# Patient Record
Sex: FEMALE | Race: WHITE | NOT HISPANIC OR LATINO
[De-identification: names, ages, dates, MRNs, and addresses within clinical notes are randomized per-mention and may not be internally consistent; named-entity substitution may affect disease eponyms.]

---
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
## 2022-04-01 ENCOUNTER — INPATIENT (INPATIENT)
Facility: HOSPITAL | Age: 22
LOS: 3 days | Discharge: HOME | End: 2022-04-05
Attending: PSYCHIATRY & NEUROLOGY | Admitting: PSYCHIATRY & NEUROLOGY

## 2022-04-01 ENCOUNTER — OUTPATIENT (OUTPATIENT)
Dept: OUTPATIENT SERVICES | Facility: HOSPITAL | Age: 22
LOS: 1 days | Discharge: STILL ADMITTED OR ADMINISTRATIVE | End: 2022-04-01

## 2022-04-01 VITALS — TEMPERATURE: 98 F

## 2022-04-01 DIAGNOSIS — F33.2 MAJOR DEPRESSIVE DISORDER, RECURRENT SEVERE WITHOUT PSYCHOTIC FEATURES: ICD-10-CM

## 2022-04-01 LAB
FLUAV AG NPH QL: SIGNIFICANT CHANGE UP
FLUBV AG NPH QL: SIGNIFICANT CHANGE UP
RSV RNA NPH QL NAA+NON-PROBE: SIGNIFICANT CHANGE UP
SARS-COV-2 RNA SPEC QL NAA+PROBE: SIGNIFICANT CHANGE UP

## 2022-04-01 PROCEDURE — 99215 OFFICE O/P EST HI 40 MIN: CPT | Mod: 95

## 2022-04-01 RX ORDER — MULTIVIT-MIN/FERROUS GLUCONATE 9 MG/15 ML
1 LIQUID (ML) ORAL DAILY
Refills: 0 | Status: DISCONTINUED | OUTPATIENT
Start: 2022-04-01 | End: 2022-04-05

## 2022-04-01 RX ORDER — HYDROXYZINE HCL 10 MG
25 TABLET ORAL EVERY 4 HOURS
Refills: 0 | Status: DISCONTINUED | OUTPATIENT
Start: 2022-04-01 | End: 2022-04-05

## 2022-04-01 RX ORDER — ARIPIPRAZOLE 15 MG/1
10 TABLET ORAL DAILY
Refills: 0 | Status: DISCONTINUED | OUTPATIENT
Start: 2022-04-01 | End: 2022-04-02

## 2022-04-01 RX ORDER — NICOTINE POLACRILEX 2 MG
1 GUM BUCCAL DAILY
Refills: 0 | Status: DISCONTINUED | OUTPATIENT
Start: 2022-04-01 | End: 2022-04-05

## 2022-04-01 RX ORDER — DIPHENHYDRAMINE HCL 50 MG
50 CAPSULE ORAL ONCE
Refills: 0 | Status: DISCONTINUED | OUTPATIENT
Start: 2022-04-01 | End: 2022-04-05

## 2022-04-01 RX ORDER — HYDROXYZINE HCL 10 MG
25 TABLET ORAL EVERY 4 HOURS
Refills: 0 | Status: DISCONTINUED | OUTPATIENT
Start: 2022-04-01 | End: 2022-04-01

## 2022-04-01 RX ORDER — LANOLIN ALCOHOL/MO/W.PET/CERES
3 CREAM (GRAM) TOPICAL AT BEDTIME
Refills: 0 | Status: DISCONTINUED | OUTPATIENT
Start: 2022-04-01 | End: 2022-04-05

## 2022-04-01 RX ORDER — CLONAZEPAM 1 MG
0.5 TABLET ORAL
Refills: 0 | Status: DISCONTINUED | OUTPATIENT
Start: 2022-04-01 | End: 2022-04-02

## 2022-04-01 RX ORDER — NICOTINE POLACRILEX 2 MG
2 GUM BUCCAL
Refills: 0 | Status: DISCONTINUED | OUTPATIENT
Start: 2022-04-01 | End: 2022-04-01

## 2022-04-01 RX ORDER — PREGABALIN 225 MG/1
1000 CAPSULE ORAL DAILY
Refills: 0 | Status: DISCONTINUED | OUTPATIENT
Start: 2022-04-01 | End: 2022-04-05

## 2022-04-01 RX ORDER — DIPHENHYDRAMINE HCL 50 MG
50 CAPSULE ORAL EVERY 6 HOURS
Refills: 0 | Status: DISCONTINUED | OUTPATIENT
Start: 2022-04-01 | End: 2022-04-05

## 2022-04-01 RX ORDER — NICOTINE POLACRILEX 2 MG
4 GUM BUCCAL
Refills: 0 | Status: DISCONTINUED | OUTPATIENT
Start: 2022-04-01 | End: 2022-04-05

## 2022-04-01 RX ORDER — HALOPERIDOL DECANOATE 100 MG/ML
5 INJECTION INTRAMUSCULAR ONCE
Refills: 0 | Status: DISCONTINUED | OUTPATIENT
Start: 2022-04-01 | End: 2022-04-05

## 2022-04-01 RX ORDER — HALOPERIDOL DECANOATE 100 MG/ML
5 INJECTION INTRAMUSCULAR EVERY 6 HOURS
Refills: 0 | Status: DISCONTINUED | OUTPATIENT
Start: 2022-04-01 | End: 2022-04-05

## 2022-04-01 RX ADMIN — Medication 3 MILLIGRAM(S): at 20:24

## 2022-04-01 RX ADMIN — Medication 4 MILLIGRAM(S): at 20:24

## 2022-04-01 RX ADMIN — Medication 0.5 MILLIGRAM(S): at 20:24

## 2022-04-01 NOTE — BH PATIENT PROFILE - FALL HARM RISK - UNIVERSAL INTERVENTIONS
Bed in lowest position, wheels locked, appropriate side rails in place/Call bell, personal items and telephone in reach/Instruct patient to call for assistance before getting out of bed or chair/Non-slip footwear when patient is out of bed/[place] to call system/Physically safe environment - no spills, clutter or unnecessary equipment/Purposeful Proactive Rounding/Room/bathroom lighting operational, light cord in reach

## 2022-04-01 NOTE — BH PATIENT PROFILE - NSCMSPTSTRENGTHS_PSY_ALL_CORE
Expressive of emotions/Future/goal oriented/Intact employment/Intelligence/Motivated/Strong support system

## 2022-04-01 NOTE — CHART NOTE - NSCHARTNOTEFT_GEN_A_CORE
Screening Medical Evaluation    Patient Admitted from: [facility] admitting diagnosis: Severe episode of recurrent major depressive disorder, without psychotic features      PAST MEDICAL & SURGICAL HISTORY:  Anxiety  No significant past surgical history    ALLERGIES:  No known allergies    SOCIAL HISTORY:  Patient states she vapes nicotine and THC but does not know the amount. Patient also endorses alcohol use socially 1-2x/week    FAMILY HISTORY:  No known pertinent family history in 1st degree relatives      MEDICATIONS  (STANDING):  ARIPiprazole 10 milliGRAM(s) Oral daily  clonazePAM  Tablet 0.5 milliGRAM(s) Oral two times a day  cyanocobalamin 1000 MICROGram(s) Oral daily  multivitamin/minerals 1 Tablet(s) Oral daily  nicotine - 21 mG/24Hr(s) Patch 1 patch Transdermal daily    MEDICATIONS  (PRN):  diphenhydrAMINE 50 milliGRAM(s) Oral every 6 hours PRN agitation  diphenhydrAMINE Injectable 50 milliGRAM(s) IntraMuscular once PRN agitation  haloperidol     Tablet 5 milliGRAM(s) Oral every 6 hours PRN agiation  haloperidol    Injectable 5 milliGRAM(s) IntraMuscular once PRN agitation  hydrOXYzine hydrochloride 25 milliGRAM(s) Oral every 4 hours PRN anxiety  LORazepam     Tablet 2 milliGRAM(s) Oral every 6 hours PRN agitation  LORazepam   Injectable 2 milliGRAM(s) IntraMuscular once PRN agitation  melatonin. 3 milliGRAM(s) Oral at bedtime PRN Insomnia  nicotine  Polacrilex Gum 4 milliGRAM(s) Oral every 2 hours PRN nicotine dependence      Vital Signs Last 24 Hrs  T(C): 36.8 (01 Apr 2022 18:36), Max: 36.8 (01 Apr 2022 18:36)  T(F): 98.3 (01 Apr 2022 18:36), Max: 98.3 (01 Apr 2022 18:36)  HR: 82 (01 Apr 2022 18:36)  BP: 107/71 (01 Apr 2022 18:36)  BP(mean): --  RR: --  SpO2: --      PHYSICAL EXAM:  GENERAL: NAD, well-developed  HEAD:  Atraumatic, Normocephalic  EYES: EOMI, PERRLA, conjunctiva and sclera clear  NECK: Supple, No JVD  CHEST/LUNG: Clear to auscultation bilaterally; No wheeze  HEART: Regular rate and rhythm; No murmurs, rubs, or gallops  ABDOMEN: Soft, Nontender, Nondistended; Bowel sounds present  EXTREMITIES:  2+ Peripheral Pulses, No clubbing, cyanosis, or edema  PSYCH: AAOx3  NEUROLOGY: non-focal  SKIN: No rashes or lesions    Assessment and Plan:  21F admitted to [facility] for severe episode of recurrent major depressive disorder, without psychotic features w/ no significant PMHx for medical screening. Patient c/o of anxiety and poor sleep for the past several years. Patient has no other acute complaints at this time. Patient denies fever, chills, headache, dizziness, lightheadedness, N/V, SOB, cough, congestion, chest pain, abdominal pain, dysuria, hematuria, diarrhea, constipation. Physical exam unremarkable, VSS. Admission labs pending.     1.) Severe episode of recurrent major depressive disorder, without psychotic features: Refer to primary team documentation for recs   2.) Nicotine dependence: Continue nicotine gum 4mg Q2h PRN, nicotine patch daily
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
## 2022-04-01 NOTE — BH INPATIENT PSYCHIATRY ASSESSMENT NOTE - NSBHCHARTREVIEWVS_PSY_A_CORE FT
Vital Signs Last 24 Hrs  T(C): 36.8 (04-01-22 @ 18:36), Max: 36.8 (04-01-22 @ 18:36)  T(F): 98.3 (04-01-22 @ 18:36), Max: 98.3 (04-01-22 @ 18:36)  HR: --  BP: --  BP(mean): --  RR: --  SpO2: --    Orthostatic VS  04-01-22 @ 18:36  Lying BP: --/-- HR: --  Sitting BP: 107/71 HR: 82  Standing BP: 122/97 HR: 84  Site: --  Mode: --

## 2022-04-01 NOTE — BH PATIENT PROFILE - NSSBIRTDRGBRIEFINTDET_GEN_A_CORE
reviewed: Chronic, regular cannabis use during adolescence has shown to negatively affect memory, attention, and psychomotor skills, potentially causing irreversible cognitive impairment.

## 2022-04-02 DIAGNOSIS — F43.10 POST-TRAUMATIC STRESS DISORDER, UNSPECIFIED: ICD-10-CM

## 2022-04-02 DIAGNOSIS — F41.1 GENERALIZED ANXIETY DISORDER: ICD-10-CM

## 2022-04-02 DIAGNOSIS — F90.9 ATTENTION-DEFICIT HYPERACTIVITY DISORDER, UNSPECIFIED TYPE: ICD-10-CM

## 2022-04-02 LAB
A1C WITH ESTIMATED AVERAGE GLUCOSE RESULT: 4.4 % — SIGNIFICANT CHANGE UP (ref 4–5.6)
ALBUMIN SERPL ELPH-MCNC: 4.9 G/DL — SIGNIFICANT CHANGE UP (ref 3.3–5)
ALP SERPL-CCNC: 63 U/L — SIGNIFICANT CHANGE UP (ref 40–120)
ALT FLD-CCNC: 7 U/L — SIGNIFICANT CHANGE UP (ref 4–33)
ANION GAP SERPL CALC-SCNC: 13 MMOL/L — SIGNIFICANT CHANGE UP (ref 7–14)
AST SERPL-CCNC: 16 U/L — SIGNIFICANT CHANGE UP (ref 4–32)
BILIRUB SERPL-MCNC: 0.7 MG/DL — SIGNIFICANT CHANGE UP (ref 0.2–1.2)
BUN SERPL-MCNC: 11 MG/DL — SIGNIFICANT CHANGE UP (ref 7–23)
CALCIUM SERPL-MCNC: 9.8 MG/DL — SIGNIFICANT CHANGE UP (ref 8.4–10.5)
CHLORIDE SERPL-SCNC: 108 MMOL/L — HIGH (ref 98–107)
CHOLEST SERPL-MCNC: 143 MG/DL — SIGNIFICANT CHANGE UP
CO2 SERPL-SCNC: 22 MMOL/L — SIGNIFICANT CHANGE UP (ref 22–31)
CREAT SERPL-MCNC: 0.72 MG/DL — SIGNIFICANT CHANGE UP (ref 0.5–1.3)
EGFR: 121 ML/MIN/1.73M2 — SIGNIFICANT CHANGE UP
ESTIMATED AVERAGE GLUCOSE: 80 — SIGNIFICANT CHANGE UP
GLUCOSE SERPL-MCNC: 71 MG/DL — SIGNIFICANT CHANGE UP (ref 70–99)
HCT VFR BLD CALC: 40.1 % — SIGNIFICANT CHANGE UP (ref 34.5–45)
HDLC SERPL-MCNC: 53 MG/DL — SIGNIFICANT CHANGE UP
HGB BLD-MCNC: 12.7 G/DL — SIGNIFICANT CHANGE UP (ref 11.5–15.5)
LIPID PNL WITH DIRECT LDL SERPL: 81 MG/DL — SIGNIFICANT CHANGE UP
MCHC RBC-ENTMCNC: 28.3 PG — SIGNIFICANT CHANGE UP (ref 27–34)
MCHC RBC-ENTMCNC: 31.7 GM/DL — LOW (ref 32–36)
MCV RBC AUTO: 89.5 FL — SIGNIFICANT CHANGE UP (ref 80–100)
NON HDL CHOLESTEROL: 90 MG/DL — SIGNIFICANT CHANGE UP
NRBC # BLD: 0 /100 WBCS — SIGNIFICANT CHANGE UP
NRBC # FLD: 0 K/UL — SIGNIFICANT CHANGE UP
PLATELET # BLD AUTO: 198 K/UL — SIGNIFICANT CHANGE UP (ref 150–400)
POTASSIUM SERPL-MCNC: 4.4 MMOL/L — SIGNIFICANT CHANGE UP (ref 3.5–5.3)
POTASSIUM SERPL-SCNC: 4.4 MMOL/L — SIGNIFICANT CHANGE UP (ref 3.5–5.3)
PROT SERPL-MCNC: 6.9 G/DL — SIGNIFICANT CHANGE UP (ref 6–8.3)
RBC # BLD: 4.48 M/UL — SIGNIFICANT CHANGE UP (ref 3.8–5.2)
RBC # FLD: 14.4 % — SIGNIFICANT CHANGE UP (ref 10.3–14.5)
SODIUM SERPL-SCNC: 143 MMOL/L — SIGNIFICANT CHANGE UP (ref 135–145)
TRIGL SERPL-MCNC: 45 MG/DL — SIGNIFICANT CHANGE UP
TSH SERPL-MCNC: 0.96 UIU/ML — SIGNIFICANT CHANGE UP (ref 0.27–4.2)
WBC # BLD: 4.99 K/UL — SIGNIFICANT CHANGE UP (ref 3.8–10.5)
WBC # FLD AUTO: 4.99 K/UL — SIGNIFICANT CHANGE UP (ref 3.8–10.5)

## 2022-04-02 PROCEDURE — 99221 1ST HOSP IP/OBS SF/LOW 40: CPT

## 2022-04-02 RX ORDER — CLONAZEPAM 1 MG
0.5 TABLET ORAL AT BEDTIME
Refills: 0 | Status: DISCONTINUED | OUTPATIENT
Start: 2022-04-02 | End: 2022-04-03

## 2022-04-02 RX ORDER — ATOMOXETINE HYDROCHLORIDE 10 MG/1
10 CAPSULE ORAL DAILY
Refills: 0 | Status: DISCONTINUED | OUTPATIENT
Start: 2022-04-03 | End: 2022-04-04

## 2022-04-02 RX ORDER — QUETIAPINE FUMARATE 200 MG/1
50 TABLET, FILM COATED ORAL AT BEDTIME
Refills: 0 | Status: DISCONTINUED | OUTPATIENT
Start: 2022-04-02 | End: 2022-04-03

## 2022-04-02 RX ADMIN — Medication 4 MILLIGRAM(S): at 20:15

## 2022-04-02 RX ADMIN — Medication 0.5 MILLIGRAM(S): at 20:15

## 2022-04-02 RX ADMIN — QUETIAPINE FUMARATE 50 MILLIGRAM(S): 200 TABLET, FILM COATED ORAL at 20:15
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
## 2022-04-02 NOTE — BH INPATIENT PSYCHIATRY ASSESSMENT NOTE - NSSUICPROTFACT_PSY_ALL_CORE
Responsibility to children, family, or others/Identifies reasons for living/Supportive social network of family or friends/Fear of death or the actual act of killing self/Cultural, spiritual and/or moral attitudes against suicide/Engaged in work or school/Beloved pets

## 2022-04-02 NOTE — BH INPATIENT PSYCHIATRY ASSESSMENT NOTE - NSBHMETABOLIC_PSY_ALL_CORE_FT
BMI:   HbA1c:   Glucose:   BP: --  Lipid Panel: 
BMI:   HbA1c: A1C with Estimated Average Glucose Result: 4.4 % (04-02-22 @ 09:44)    Glucose:   BP: --  Lipid Panel: Date/Time: 04-02-22 @ 09:44  Cholesterol, Serum: 143  Direct LDL: --  HDL Cholesterol, Serum: 53  Total Cholesterol/HDL Ration Measurement: --  Triglycerides, Serum: 45

## 2022-04-02 NOTE — BH INPATIENT PSYCHIATRY ASSESSMENT NOTE - NSBHCHARTREVIEWVS_PSY_A_CORE FT
Vital Signs Last 24 Hrs  T(C): 36.9 (04-02-22 @ 06:24), Max: 36.9 (04-02-22 @ 06:24)  T(F): 98.4 (04-02-22 @ 06:24), Max: 98.4 (04-02-22 @ 06:24)  HR: --  BP: --  BP(mean): --  RR: --  SpO2: --    Orthostatic VS  04-02-22 @ 06:24  Lying BP: --/-- HR: --  Sitting BP: 113/67 HR: 65  Standing BP: 115/67 HR: 65  Site: --  Mode: --  Orthostatic VS  04-01-22 @ 18:36  Lying BP: --/-- HR: --  Sitting BP: 107/71 HR: 82  Standing BP: 122/97 HR: 84  Site: --  Mode: --

## 2022-04-02 NOTE — BH INPATIENT PSYCHIATRY ASSESSMENT NOTE - NSICDXBHSECONDARYDX_PSY_ALL_CORE
Post traumatic stress disorder (PTSD)   F43.10  ADHD   F90.9  GARO (generalized anxiety disorder)   F41.1

## 2022-04-02 NOTE — BH INPATIENT PSYCHIATRY ASSESSMENT NOTE - CURRENT MEDICATION
MEDICATIONS  (STANDING):    MEDICATIONS  (PRN):  diphenhydrAMINE 50 milliGRAM(s) Oral every 6 hours PRN agitation  diphenhydrAMINE Injectable 50 milliGRAM(s) IntraMuscular once PRN agitation  haloperidol     Tablet 5 milliGRAM(s) Oral every 6 hours PRN agiation  haloperidol    Injectable 5 milliGRAM(s) IntraMuscular once PRN agitation  LORazepam     Tablet 2 milliGRAM(s) Oral every 6 hours PRN agitation  LORazepam   Injectable 2 milliGRAM(s) IntraMuscular once PRN agitation  
MEDICATIONS  (STANDING):  clonazePAM  Tablet 0.5 milliGRAM(s) Oral at bedtime  cyanocobalamin 1000 MICROGram(s) Oral daily  multivitamin/minerals 1 Tablet(s) Oral daily  nicotine - 21 mG/24Hr(s) Patch 1 patch Transdermal daily    MEDICATIONS  (PRN):  diphenhydrAMINE 50 milliGRAM(s) Oral every 6 hours PRN agitation  diphenhydrAMINE Injectable 50 milliGRAM(s) IntraMuscular once PRN agitation  haloperidol     Tablet 5 milliGRAM(s) Oral every 6 hours PRN agiation  haloperidol    Injectable 5 milliGRAM(s) IntraMuscular once PRN agitation  hydrOXYzine hydrochloride 25 milliGRAM(s) Oral every 4 hours PRN anxiety  LORazepam     Tablet 2 milliGRAM(s) Oral every 6 hours PRN agitation  LORazepam   Injectable 2 milliGRAM(s) IntraMuscular once PRN agitation  melatonin. 3 milliGRAM(s) Oral at bedtime PRN Insomnia  nicotine  Polacrilex Gum 4 milliGRAM(s) Oral every 2 hours PRN nicotine dependence

## 2022-04-02 NOTE — PSYCHIATRIC REHAB INITIAL EVALUATION - NSBHEMPPOSITIONFT_PSY_ALL_CORE
Pt reports working part-time at [employer]. However, she states she recently got a job in the city and is unsure when she is starting.

## 2022-04-02 NOTE — BH INPATIENT PSYCHIATRY ASSESSMENT NOTE - MSE UNSTRUCTURED FT
On exam today the patient is tired but generally cooperative with fair eye contact.    Speech is clear and of normal rate.    Thought process: with no evidence of a disorder of thought process.    Thought content: with no evidence of psychotic beliefs.  Perception: Denies hallucinations.    Mood: Describes as "tired and edgy".  Affect: constricted.    Patient denies active suicidal ideation, intent and plan.    Patient denies active aggressive/homicidal ideation, intent or plan.   Patient is Alert and oriented in all spheres. Patient is cognitively grossly intact.   Insight and judgment are limited. Impulse control is intact at this time.    Vital signs are stable. Gait and station WNL

## 2022-04-02 NOTE — PSYCHIATRIC REHAB INITIAL EVALUATION - NSBHALCSUBCHOICE_PSY_ALL_CORE
Patient vapes nicotine and THC regularly; she does not express an interest in stopping. Patient uses alcohol socially.

## 2022-04-02 NOTE — PSYCHIATRIC REHAB INITIAL EVALUATION - NSBHSTRENGTHHOME_PSY_ALL_CORE
Pt reports that has a good relationship with her family, however, recently has been having interpersonal conflicts with her mother and is therefore isolating to her room.

## 2022-04-02 NOTE — PSYCHIATRIC REHAB INITIAL EVALUATION - NSBHPRRECOMMEND_PSY_ALL_CORE
Pt presents with report of worsening anxiety and beginning to feel depressed over the last week iso interpersonal conflict with her mother and multiple losses over the last two weeks. Per medical records and collateral of sister, patient has not been sleeping and has been labile, grandiose, and potentially paranoid.  Writer met with patient and introduced self, psychiatric rehabilitation staff and services.  Patient was provided with a copy of the unit schedule.  Patient was seen laying in bed. Pt was pleasant, organized, and engaged in session. Pt identified anxiety and recently losing her tempter due to conflicts with her mother as her primary reasons for admission. Patient demonstrated good impulse control during assessment, displayed fair insight and fair judgment into her symptoms and treatment at this time.  Writer encouraged patient to attend psychiatric rehabilitation activities and engage in treatment.  Psychiatric Rehabilitation staff will continue to engage patient daily in order to develop therapeutic rapport. Writer and patient were able to establish a collaborative psychiatric rehabilitation goal.

## 2022-04-02 NOTE — BH INPATIENT PSYCHIATRY ASSESSMENT NOTE - NSTXCOPEGOALOTHER_PSY_ALL_CORE
Pt will develop a safety plan with at least 4 coping skills for improved symptom management and sustained recovery.

## 2022-04-03 DIAGNOSIS — F60.89 OTHER SPECIFIC PERSONALITY DISORDERS: ICD-10-CM

## 2022-04-03 LAB
A1C WITH ESTIMATED AVERAGE GLUCOSE RESULT: 4.4 % — SIGNIFICANT CHANGE UP (ref 4–5.6)
CHOLEST SERPL-MCNC: 125 MG/DL — SIGNIFICANT CHANGE UP
ESTIMATED AVERAGE GLUCOSE: 80 — SIGNIFICANT CHANGE UP
HDLC SERPL-MCNC: 44 MG/DL — LOW
LIPID PNL WITH DIRECT LDL SERPL: 73 MG/DL — SIGNIFICANT CHANGE UP
NON HDL CHOLESTEROL: 81 MG/DL — SIGNIFICANT CHANGE UP
TRIGL SERPL-MCNC: 40 MG/DL — SIGNIFICANT CHANGE UP

## 2022-04-03 PROCEDURE — 99232 SBSQ HOSP IP/OBS MODERATE 35: CPT

## 2022-04-03 RX ORDER — QUETIAPINE FUMARATE 200 MG/1
100 TABLET, FILM COATED ORAL AT BEDTIME
Refills: 0 | Status: DISCONTINUED | OUTPATIENT
Start: 2022-04-03 | End: 2022-04-05

## 2022-04-03 RX ADMIN — Medication 1 PATCH: at 08:52

## 2022-04-03 RX ADMIN — Medication 4 MILLIGRAM(S): at 15:13

## 2022-04-03 RX ADMIN — Medication 1 TABLET(S): at 08:53

## 2022-04-03 RX ADMIN — QUETIAPINE FUMARATE 100 MILLIGRAM(S): 200 TABLET, FILM COATED ORAL at 21:12

## 2022-04-03 RX ADMIN — Medication 4 MILLIGRAM(S): at 22:15

## 2022-04-03 RX ADMIN — ATOMOXETINE HYDROCHLORIDE 10 MILLIGRAM(S): 10 CAPSULE ORAL at 08:52

## 2022-04-03 RX ADMIN — PREGABALIN 1000 MICROGRAM(S): 225 CAPSULE ORAL at 08:53

## 2022-04-03 RX ADMIN — Medication 4 MILLIGRAM(S): at 09:34

## 2022-04-04 DIAGNOSIS — F41.1 GENERALIZED ANXIETY DISORDER: ICD-10-CM

## 2022-04-04 DIAGNOSIS — F31.10 BIPOLAR DISORDER, CURRENT EPISODE MANIC WITHOUT PSYCHOTIC FEATURES, UNSPECIFIED: ICD-10-CM

## 2022-04-04 PROCEDURE — 99232 SBSQ HOSP IP/OBS MODERATE 35: CPT

## 2022-04-04 RX ORDER — ATOMOXETINE HYDROCHLORIDE 10 MG/1
25 CAPSULE ORAL DAILY
Refills: 0 | Status: DISCONTINUED | OUTPATIENT
Start: 2022-04-05 | End: 2022-04-05

## 2022-04-04 RX ORDER — IBUPROFEN 200 MG
400 TABLET ORAL EVERY 6 HOURS
Refills: 0 | Status: DISCONTINUED | OUTPATIENT
Start: 2022-04-04 | End: 2022-04-05

## 2022-04-04 RX ADMIN — Medication 4 MILLIGRAM(S): at 21:33

## 2022-04-04 RX ADMIN — Medication 1 PATCH: at 16:08

## 2022-04-04 RX ADMIN — Medication 1 PATCH: at 08:54

## 2022-04-04 RX ADMIN — Medication 1 TABLET(S): at 08:54

## 2022-04-04 RX ADMIN — Medication 1 PATCH: at 07:58

## 2022-04-04 RX ADMIN — Medication 4 MILLIGRAM(S): at 10:06

## 2022-04-04 RX ADMIN — QUETIAPINE FUMARATE 100 MILLIGRAM(S): 200 TABLET, FILM COATED ORAL at 21:09

## 2022-04-04 RX ADMIN — Medication 400 MILLIGRAM(S): at 14:45

## 2022-04-04 RX ADMIN — PREGABALIN 1000 MICROGRAM(S): 225 CAPSULE ORAL at 08:54

## 2022-04-04 RX ADMIN — Medication 400 MILLIGRAM(S): at 16:12

## 2022-04-04 RX ADMIN — ATOMOXETINE HYDROCHLORIDE 10 MILLIGRAM(S): 10 CAPSULE ORAL at 08:54

## 2022-04-04 NOTE — BH TREATMENT PLAN - NSTXCOPEINTERPR_PSY_ALL_CORE
Psych rehab staff will encourage daily group/activity engagement as well as facilitate goal attainment over the next 7 days for improved symptom management.
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
## 2022-04-04 NOTE — BH TREATMENT PLAN - NSTXPLANTYPE_PSY_ALL_CORE
Saturation: Site=PA (Pulmonary Artery) , O2=58.7 %, Hgb=10.8 gm/dl, Condition=Condition 1. Used in calculation. Initial

## 2022-04-05 PROCEDURE — 99231 SBSQ HOSP IP/OBS SF/LOW 25: CPT

## 2022-04-05 RX ORDER — ATOMOXETINE HYDROCHLORIDE 10 MG/1
1 CAPSULE ORAL
Qty: 15 | Refills: 0
Start: 2022-04-05 | End: 2022-04-19

## 2022-04-05 RX ORDER — QUETIAPINE FUMARATE 200 MG/1
1 TABLET, FILM COATED ORAL
Qty: 15 | Refills: 0
Start: 2022-04-05 | End: 2022-04-19

## 2022-04-05 RX ADMIN — Medication 1 PATCH: at 12:16

## 2022-04-05 RX ADMIN — QUETIAPINE FUMARATE 100 MILLIGRAM(S): 200 TABLET, FILM COATED ORAL at 20:45

## 2022-04-05 RX ADMIN — PREGABALIN 1000 MICROGRAM(S): 225 CAPSULE ORAL at 08:33

## 2022-04-05 RX ADMIN — Medication 4 MILLIGRAM(S): at 20:29

## 2022-04-05 RX ADMIN — Medication 1 PATCH: at 08:33

## 2022-04-05 RX ADMIN — Medication 4 MILLIGRAM(S): at 07:49

## 2022-04-05 RX ADMIN — ATOMOXETINE HYDROCHLORIDE 25 MILLIGRAM(S): 10 CAPSULE ORAL at 08:33

## 2022-04-05 RX ADMIN — Medication 1 TABLET(S): at 08:33

## 2022-04-05 NOTE — BH DISCHARGE NOTE NURSING/SOCIAL WORK/PSYCH REHAB - NSBHDCADDR1FT_A_CORE
Your appointment will be via Zoom. Please be available to receive phone calls prior to your appointment to register you in the college track clinic.  Please check your email for Zoom link or Zoom ID and be ready for your appointment on time in a private location. Your appointment will be via Zoom. ***You will be receiving a call from the college track registration team the day before to get consent for treatment and to verify demographic information. If you are not available to do this process, the intake could be delayed or even postponed.   Please check your email for Zoom link or Zoom ID and be ready for your appointment on time in a private location.

## 2022-04-05 NOTE — BH DISCHARGE NOTE NURSING/SOCIAL WORK/PSYCH REHAB - NSCDUDCCRISIS_PSY_A_CORE
[facility] Well  1 [phone]) [facility]-WELL ([phone])  Text "WELL" to 67039  Website: www.[employer]/.[employer] 1 (381) 751-ASZE (8406) Website: www.safehorizon.org/.National Suicide Prevention Lifeline 2 (203) 593-8590/.  [employer]  1 (720) LIFENET (829-6817)/.  [facility]  [address] [place] [address] [phone]   Hours:  Monday through Friday from 9 AM to 3 PM/.  [facility] [facility] Crisis Line  [phone], Option 1

## 2022-04-05 NOTE — BH INPATIENT PSYCHIATRY DISCHARGE NOTE - NSDCMRMEDTOKEN_GEN_ALL_CORE_FT
atomoxetine 25 mg oral capsule: 1 cap(s) orally once a day  QUEtiapine 100 mg oral tablet: 1 tab(s) orally once a day (at bedtime)

## 2022-04-05 NOTE — BH INPATIENT PSYCHIATRY DISCHARGE NOTE - NSCORESITESY/N_GEN_A_CORE_RD
Yes
[de-identified] : Ms. Bullard has recently been noted to have an elevated hemoglobin near 17g. She has smoked at least a half pack of cigarettes per day for many years. No history of sleep apnea. She was also noted to have a single H63D hemochromatosis gene mutation with her primary care physician. Ferritin was in the 100s with a normal percent saturation, total iron and TIBC.

## 2022-04-05 NOTE — BH INPATIENT PSYCHIATRY DISCHARGE NOTE - NSDCCPCAREPLAN_GEN_ALL_CORE_FT
PRINCIPAL DISCHARGE DIAGNOSIS  Diagnosis: Bipolar disorder, unspecified  Assessment and Plan of Treatment:       SECONDARY DISCHARGE DIAGNOSES  Diagnosis: Post traumatic stress disorder (PTSD)  Assessment and Plan of Treatment:     Diagnosis: ADHD  Assessment and Plan of Treatment:     Diagnosis: GARO (generalized anxiety disorder)  Assessment and Plan of Treatment:     Diagnosis: Cluster B personality disorder  Assessment and Plan of Treatment:      PRINCIPAL DISCHARGE DIAGNOSIS  Diagnosis: Unspecified mood [affective] disorder  Assessment and Plan of Treatment:       SECONDARY DISCHARGE DIAGNOSES  Diagnosis: Post traumatic stress disorder (PTSD)  Assessment and Plan of Treatment:     Diagnosis: ADHD  Assessment and Plan of Treatment:     Diagnosis: GARO (generalized anxiety disorder)  Assessment and Plan of Treatment:     Diagnosis: Cluster B personality disorder  Assessment and Plan of Treatment:

## 2022-04-05 NOTE — BH DISCHARGE NOTE NURSING/SOCIAL WORK/PSYCH REHAB - NSBHDCAGENCY1FT_PSY_A_CORE
[facility] / Dr. Buck
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
## 2022-04-05 NOTE — BH INPATIENT PSYCHIATRY DISCHARGE NOTE - NSBHMETABOLIC_PSY_ALL_CORE_FT
BMI:   HbA1c: A1C with Estimated Average Glucose Result: 4.4 % (04-03-22 @ 10:52)    Glucose:   BP: 107/70 (04-05-22 @ 07:18) (107/70 - 115/76)  Lipid Panel: Date/Time: 04-03-22 @ 10:52  Cholesterol, Serum: 125  Direct LDL: --  HDL Cholesterol, Serum: 44  Total Cholesterol/HDL Ration Measurement: --  Triglycerides, Serum: 40
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
## 2022-04-05 NOTE — BH INPATIENT PSYCHIATRY DISCHARGE NOTE - HOSPITAL COURSE
On discharge exam today the patient is generally cooperative and makes fair eye contact.   Speech is clear and of normal rate.  Thought process: with no disorder of thought process.   Thought content: with no evidence of delusional beliefs.   Perception: Denies hallucinations.  Mood: Describes as "improved"   Affect: flat.  Patient denies suicidal and aggressive ideation, intent and plan.   AAO X3. Cognitively grossly intact.   Insight and judgment are improved.  Impulse control is intact at this time    Suicide and risk assessment performed prior to discharge.   The patient has a low acute risk and low chronic risk of self-harm and aggression towards others.     Protective factors include denying SI, no SIB, denying HI, good social supports in their family, no substance abuse, no current mood symptoms, no hopelessness, future-oriented in returning to home, no access to firearms.      Risk factors include presenting illness. Immediate risk was minimized by inpatient admission to a safe environment with appropriate supervision and limited access to lethal means.     Future risk was minimized before discharge by treatment of acute episode, maximizing outpatient support, providing relevant patient education, discussing emergency procedures, and ensuring close follow-up.     The patient remains at a low risk of self-harm, and such risk cannot be further ameliorated by continued inpatient treatment and the patient is therefore appropriate for discharge.       There were no behavioral problems on the unit.  Patient did not become agitated and did not require emergent intramuscular medications or seclusion / restraints.  Patient did not self-harm on the unit.      Patient remained actively engaged in treatment.  Patient participated in individual, group, and milieu therapy.  Patient got along appropriately with staff and peers.     Patient did not have any medical problems during this hospitalization.  There were no medical consultations.    A full discussion of the factors that predict treatment success and relapse was held including safety planning.  A discussion of the risks and benefits of patient’s medication was held including a discussion of the metabolic risks and risk of EPS and TD was done     On day of discharge, the patient no longer requires inpatient treatment and care. Patient denies all suicidal and aggressive ideation, intent and plan. Patient denies anxiety symptoms and panic attacks. Patient is not judged to be an acute danger to self or others at this time. Patient will be discharged to home and outpatient follow up.     Patient admitted to [address] [place] [address] from 4/1 through 4/6/2022  Patient was initially depressed and had been irritable prior to admission  Described a history of depressive symptoms, irritability, ADHD since High School with some stimulant overuse  Described a very conflictual relationship with her mother  Had symptoms of mood lability, irritability, impulsivity, low self-esteem and maladaptive behaviors that are both along the Bipolar Spectrum as well as Cluster B Personality features most notably Borderline Personality Disorder   Patient was started on low dose quetiapine which was titrated to 100 mg HS  Patient admitted to over utilizing her stimulant Adderall and agreed to a change to atomoxetine which she tolerated well     On discharge exam today the patient is generally cooperative and makes fair eye contact.   Speech is clear and of normal rate.  Thought process: with no disorder of thought process.   Thought content: with no evidence of delusional beliefs.   Perception: Denies hallucinations.  Mood: Describes as "improved"   Affect: flat.  Patient denies suicidal and aggressive ideation, intent and plan.   AAO X3. Cognitively grossly intact.   Insight and judgment are improved.  Impulse control is intact at this time    Suicide and risk assessment performed prior to discharge.   The patient has a low acute risk and low chronic risk of self-harm and aggression towards others.     Protective factors include denying SI, no SIB, denying HI, good social supports in their family, no substance abuse, no current mood symptoms, no hopelessness, future-oriented in returning to home, no access to firearms.      Risk factors include presenting illness. Immediate risk was minimized by inpatient admission to a safe environment with appropriate supervision and limited access to lethal means.     Future risk was minimized before discharge by treatment of acute episode, maximizing outpatient support, providing relevant patient education, discussing emergency procedures, and ensuring close follow-up.     The patient remains at a low risk of self-harm, and such risk cannot be further ameliorated by continued inpatient treatment and the patient is therefore appropriate for discharge.       There were no behavioral problems on the unit.  Patient did not become agitated and did not require emergent intramuscular medications or seclusion / restraints.  Patient did not self-harm on the unit.      Patient remained actively engaged in treatment.  Patient participated in individual, group, and milieu therapy.  Patient got along appropriately with staff and peers.     Patient did not have any medical problems during this hospitalization.  There were no medical consultations.    A full discussion of the factors that predict treatment success and relapse was held including safety planning.  A discussion of the risks and benefits of patient’s medication was held including a discussion of the metabolic risks and risk of EPS and TD was done     On day of discharge, the patient no longer requires inpatient treatment and care. Patient denies all suicidal and aggressive ideation, intent and plan. Patient denies anxiety symptoms and panic attacks. Patient is not judged to be an acute danger to self or others at this time. Patient will be discharged to home and outpatient follow up at [facility]

## 2022-04-05 NOTE — BH DISCHARGE NOTE NURSING/SOCIAL WORK/PSYCH REHAB - NSDPDISTO_PSY_ALL_CORE
Pt will be living with her aunt, Digna Gifford./Home Pt will be living with her aunt, Digna Gifford.  [phone]  [address] [place] [address]/Home

## 2022-04-05 NOTE — BH PSYCHOLOGY - GROUP THERAPY NOTE - NSPSYCHOLGRPDBTPT_PSY_A_CORE FT
DBT Group is a group in which patients learn skills to better manage their emotions and behaviors. Group begins with a mindfulness practice so that patients have an opportunity to practice observing their internal and external experiences. Following the mindfulness exercise the group learns new skills in a didactic format. Group today focused on the “distress tolerance” module. Specifically, patients learned the “IMPROVE” skill which involves using mental techniques to lower distress. These techniques include using imagery, making meaning, using prayer, focusing on one thing at a time, taking a vacation, and using encouragement. Patients practiced some of these skills in session as they were guided through a relaxation and grounding exercise.

## 2022-04-05 NOTE — BH DISCHARGE NOTE NURSING/SOCIAL WORK/PSYCH REHAB - NSDCPEWEB_GEN_ALL_CORE
[facility] for Tobacco Control website --- [email or web address] website --- [email or web address]

## 2022-04-05 NOTE — BH PSYCHOLOGY - GROUP THERAPY NOTE - NSPSYCHOLGRPDBTGOAL_PSY_A_CORE
reduce mood and affective lability/improve ability to indentify feelings/reduce vulnerability to emotional dysregualation

## 2022-04-05 NOTE — BH DISCHARGE NOTE NURSING/SOCIAL WORK/PSYCH REHAB - DISCHARGE INSTRUCTIONS AFTERCARE APPOINTMENTS
In order to check the location, date, or time of your aftercare appointment, please refer to your Discharge Instructions Document given to you upon leaving the hospital.  If you have lost the instructions please call [phone]

## 2022-04-05 NOTE — BH DISCHARGE NOTE NURSING/SOCIAL WORK/PSYCH REHAB - NSDCPRGOAL_PSY_ALL_CORE
Writer met with pt for safety planning and discuss the pt’s progress throughout this hospitalization. Pt was able to complete safety plan with writer’s encouragement. Pt was able to identify warning signs, coping skills and people that she can reach out for support after discharge. Pt has made progress into her psych rehab goal of identifying coping skills for better sxs management. Pt was able to identify talking to friends and exercising as her coping strategies. Pt reported that her mood is better, and she is feeling hopeful. Pt reported this hospitalization provided her with space to realize that she needs therapy support to process her unresolved feelings and trauma. Pt is future oriented and feels ready to be discharge with her family support. Pt reported that she is happy that she is going to stay away from her mother and live with aunt, and she believes that is a better environment for her mental health. During this hospitalization, pt has been compliant with medications. Pt denied SI/HI/AH/VH. Pt was in good behavioral control on the unit. Pt was calm and cooperative with staff. Pt was social and visible on the unit. Pt was receptive to skill development. Pt attended 70% of psych rehab groups. Pt was verbal and engaged in groups. Pt utilized groups to process her feelings and to explore coping skills.

## 2022-04-06 VITALS — SYSTOLIC BLOOD PRESSURE: 108 MMHG | TEMPERATURE: 98 F | DIASTOLIC BLOOD PRESSURE: 78 MMHG | HEART RATE: 86 BPM

## 2022-04-06 PROCEDURE — 99239 HOSP IP/OBS DSCHRG MGMT >30: CPT

## 2022-04-06 RX ADMIN — ATOMOXETINE HYDROCHLORIDE 25 MILLIGRAM(S): 10 CAPSULE ORAL at 08:36

## 2022-04-06 RX ADMIN — Medication 1 PATCH: at 08:37

## 2022-04-06 RX ADMIN — PREGABALIN 1000 MICROGRAM(S): 225 CAPSULE ORAL at 08:36

## 2022-04-06 RX ADMIN — Medication 1 TABLET(S): at 08:37

## 2022-04-06 NOTE — BH INPATIENT PSYCHIATRY PROGRESS NOTE - NSBHMETABOLIC_PSY_ALL_CORE_FT
BMI:   HbA1c: A1C with Estimated Average Glucose Result: 4.4 % (04-03-22 @ 10:52)    Glucose:   BP: 115/76 (04-04-22 @ 08:27) (115/76 - 115/76)  Lipid Panel: Date/Time: 04-03-22 @ 10:52  Cholesterol, Serum: 125  Direct LDL: --  HDL Cholesterol, Serum: 44  Total Cholesterol/HDL Ration Measurement: --  Triglycerides, Serum: 40  
BMI:   HbA1c: A1C with Estimated Average Glucose Result: 4.4 % (04-03-22 @ 10:52)    Glucose:   BP: --  Lipid Panel: Date/Time: 04-03-22 @ 10:52  Cholesterol, Serum: 125  Direct LDL: --  HDL Cholesterol, Serum: 44  Total Cholesterol/HDL Ration Measurement: --  Triglycerides, Serum: 40  
BMI:   HbA1c: A1C with Estimated Average Glucose Result: 4.4 % (04-03-22 @ 10:52)    Glucose:   BP: 108/78 (04-06-22 @ 08:08) (107/70 - 115/76)  Lipid Panel: Date/Time: 04-03-22 @ 10:52  Cholesterol, Serum: 125  Direct LDL: --  HDL Cholesterol, Serum: 44  Total Cholesterol/HDL Ration Measurement: --  Triglycerides, Serum: 40  
BMI:   HbA1c: A1C with Estimated Average Glucose Result: 4.4 % (04-03-22 @ 10:52)    Glucose:   BP: 107/70 (04-05-22 @ 07:18) (107/70 - 115/76)  Lipid Panel: Date/Time: 04-03-22 @ 10:52  Cholesterol, Serum: 125  Direct LDL: --  HDL Cholesterol, Serum: 44  Total Cholesterol/HDL Ration Measurement: --  Triglycerides, Serum: 40

## 2022-04-06 NOTE — BH INPATIENT PSYCHIATRY PROGRESS NOTE - NSCGISEVERILLNESS_PSY_ALL_CORE
5 = Markedly ill - intrusive symptoms that distinctly impair social/occupational function or cause intrusive levels of distress
4 = Moderately ill – overt symptoms causing noticeable, but modest, functional impairment or distress; symptom level may warrant medication
3 = Mildly ill – clearly established symptoms with minimal, if any, distress or difficulty in social and occupational function

## 2022-04-06 NOTE — BH INPATIENT PSYCHIATRY PROGRESS NOTE - NSBHCHARTREVIEWVS_PSY_A_CORE FT
Vital Signs Last 24 Hrs  T(C): 36.8 (04-04-22 @ 08:27), Max: 36.8 (04-04-22 @ 08:27)  T(F): 98.3 (04-04-22 @ 08:27), Max: 98.3 (04-04-22 @ 08:27)  HR: 85 (04-04-22 @ 08:27) (85 - 85)  BP: 115/76 (04-04-22 @ 08:27) (115/76 - 115/76)  BP(mean): --  RR: --  SpO2: --    Orthostatic VS  04-03-22 @ 06:32  Lying BP: --/-- HR: --  Sitting BP: 109/72 HR: 64  Standing BP: 110/67 HR: 89  Site: --  Mode: --  
Vital Signs Last 24 Hrs  T(C): 36.1 (04-05-22 @ 07:18), Max: 36.7 (04-04-22 @ 17:54)  T(F): 96.9 (04-05-22 @ 07:18), Max: 98.1 (04-04-22 @ 17:54)  HR: 92 (04-05-22 @ 07:18) (92 - 92)  BP: 107/70 (04-05-22 @ 07:18) (107/70 - 107/70)  BP(mean): --  RR: --  SpO2: --    
Vital Signs Last 24 Hrs  T(C): 36.6 (04-03-22 @ 06:32), Max: 36.9 (04-02-22 @ 17:09)  T(F): 97.8 (04-03-22 @ 06:32), Max: 98.5 (04-02-22 @ 17:09)  HR: --  BP: --  BP(mean): --  RR: --  SpO2: --    Orthostatic VS  04-03-22 @ 06:32  Lying BP: --/-- HR: --  Sitting BP: 109/72 HR: 64  Standing BP: 110/67 HR: 89  Site: --  Mode: --  Orthostatic VS  04-02-22 @ 06:24  Lying BP: --/-- HR: --  Sitting BP: 113/67 HR: 65  Standing BP: 115/67 HR: 65  Site: --  Mode: --  Orthostatic VS  04-01-22 @ 18:36  Lying BP: --/-- HR: --  Sitting BP: 107/71 HR: 82  Standing BP: 122/97 HR: 84  Site: --  Mode: --  
Vital Signs Last 24 Hrs  T(C): 36.5 (04-06-22 @ 08:08), Max: 36.7 (04-05-22 @ 17:18)  T(F): 97.7 (04-06-22 @ 08:08), Max: 98.1 (04-05-22 @ 17:18)  HR: 86 (04-06-22 @ 08:08) (86 - 86)  BP: 108/78 (04-06-22 @ 08:08) (108/78 - 108/78)  BP(mean): --  RR: --  SpO2: --

## 2022-04-06 NOTE — BH INPATIENT PSYCHIATRY PROGRESS NOTE - MSE UNSTRUCTURED FT
On exam today the patient is calm and cooperative .    Speech is clear and of normal rate.   Thought process: with no evidence of a disorder of thought process.    Thought content: with no evidence of psychotic beliefs.  Perception: Denies hallucinations.    Mood: Describes as "less tired today".  Affect: constricted.    Patient denies active suicidal ideation, intent and plan.    Patient denies active aggressive/homicidal ideation, intent or plan.   Patient is Alert and oriented in all spheres. Patient is cognitively grossly intact.   Insight and judgment are limited. Impulse control is intact at this time.    Vital signs are stable. Gait and station WNL  
On exam today the patient is calm and cooperative .    Speech is clear and of normal rate.   Thought process: with no evidence of a disorder of thought process.    Thought content: with no evidence of psychotic beliefs.  Perception: Denies hallucinations.    Mood: Describes as "less depressed today".  Affect: constricted.    Patient denies active suicidal ideation, intent and plan.    Patient denies active aggressive/homicidal ideation, intent or plan.   Patient is Alert and oriented in all spheres. Patient is cognitively grossly intact.   Insight and judgment are limited. Impulse control is intact at this time.    Vital signs are stable. Gait and station WNL  
On exam today the patient is calm and cooperative .    Speech is clear and of normal rate.   Thought process: with no evidence of a disorder of thought process.    Thought content: with no evidence of psychotic beliefs.  Perception: Denies hallucinations.    Mood: Describes as "better today".  Affect: constricted.    Patient denies active suicidal ideation, intent and plan.    Patient denies active aggressive/homicidal ideation, intent or plan.   Patient is Alert and oriented in all spheres. Patient is cognitively grossly intact.   Insight and judgment are limited. Impulse control is intact at this time.    Vital signs are stable. Gait and station WNL  
On exam today the patient is calm and cooperative .    Speech is clear and of normal rate.   Thought process: with no evidence of a disorder of thought process.    Thought content: with no evidence of psychotic beliefs.  Perception: Denies hallucinations.    Mood: Describes as "very well.  Affect: constricted.    Patient denies active suicidal ideation, intent and plan.    Patient denies active aggressive/homicidal ideation, intent or plan.   Patient is Alert and oriented in all spheres. Patient is cognitively grossly intact.   Insight and judgment are limited. Impulse control is intact at this time.    Vital signs are stable. Gait and station WNL

## 2022-04-06 NOTE — BH INPATIENT PSYCHIATRY PROGRESS NOTE - NSICDXBHTERTIARYDX_PSY_ALL_CORE
R/O Bipolar II disorder   F31.81  

## 2022-04-06 NOTE — BH INPATIENT PSYCHIATRY PROGRESS NOTE - NSICDXBHPRIMARYDX_PSY_ALL_CORE
Bipolar disorder, unspecified   F31.9  
Depressed bipolar II disorder   F31.81  
Bipolar disorder, unspecified   F31.9  
Depressed bipolar II disorder   F31.81

## 2022-04-06 NOTE — BH INPATIENT PSYCHIATRY PROGRESS NOTE - CURRENT MEDICATION
MEDICATIONS  (STANDING):  atoMOXetine. 10 milliGRAM(s) Oral daily  cyanocobalamin 1000 MICROGram(s) Oral daily  multivitamin/minerals 1 Tablet(s) Oral daily  nicotine - 21 mG/24Hr(s) Patch 1 patch Transdermal daily  QUEtiapine 100 milliGRAM(s) Oral at bedtime    MEDICATIONS  (PRN):  diphenhydrAMINE 50 milliGRAM(s) Oral every 6 hours PRN agitation  diphenhydrAMINE Injectable 50 milliGRAM(s) IntraMuscular once PRN agitation  haloperidol     Tablet 5 milliGRAM(s) Oral every 6 hours PRN agiation  haloperidol    Injectable 5 milliGRAM(s) IntraMuscular once PRN agitation  hydrOXYzine hydrochloride 25 milliGRAM(s) Oral every 4 hours PRN anxiety  LORazepam     Tablet 2 milliGRAM(s) Oral every 6 hours PRN agitation  LORazepam   Injectable 2 milliGRAM(s) IntraMuscular once PRN agitation  melatonin. 3 milliGRAM(s) Oral at bedtime PRN Insomnia  nicotine  Polacrilex Gum 4 milliGRAM(s) Oral every 2 hours PRN nicotine dependence  
MEDICATIONS  (STANDING):  atoMOXetine. 25 milliGRAM(s) Oral daily  cyanocobalamin 1000 MICROGram(s) Oral daily  multivitamin/minerals 1 Tablet(s) Oral daily  nicotine - 21 mG/24Hr(s) Patch 1 patch Transdermal daily  QUEtiapine 100 milliGRAM(s) Oral at bedtime    MEDICATIONS  (PRN):  diphenhydrAMINE 50 milliGRAM(s) Oral every 6 hours PRN agitation  diphenhydrAMINE Injectable 50 milliGRAM(s) IntraMuscular once PRN agitation  haloperidol     Tablet 5 milliGRAM(s) Oral every 6 hours PRN agiation  haloperidol    Injectable 5 milliGRAM(s) IntraMuscular once PRN agitation  hydrOXYzine hydrochloride 25 milliGRAM(s) Oral every 4 hours PRN anxiety  ibuprofen  Tablet. 400 milliGRAM(s) Oral every 6 hours PRN Mild Pain (1 - 3), Moderate Pain (4 - 6), Severe Pain (7 - 10)  LORazepam     Tablet 2 milliGRAM(s) Oral every 6 hours PRN agitation  LORazepam   Injectable 2 milliGRAM(s) IntraMuscular once PRN agitation  melatonin. 3 milliGRAM(s) Oral at bedtime PRN Insomnia  nicotine  Polacrilex Gum 4 milliGRAM(s) Oral every 2 hours PRN nicotine dependence  
MEDICATIONS  (STANDING):  atoMOXetine. 10 milliGRAM(s) Oral daily  cyanocobalamin 1000 MICROGram(s) Oral daily  multivitamin/minerals 1 Tablet(s) Oral daily  nicotine - 21 mG/24Hr(s) Patch 1 patch Transdermal daily  QUEtiapine 100 milliGRAM(s) Oral at bedtime    MEDICATIONS  (PRN):  diphenhydrAMINE 50 milliGRAM(s) Oral every 6 hours PRN agitation  diphenhydrAMINE Injectable 50 milliGRAM(s) IntraMuscular once PRN agitation  haloperidol     Tablet 5 milliGRAM(s) Oral every 6 hours PRN agiation  haloperidol    Injectable 5 milliGRAM(s) IntraMuscular once PRN agitation  hydrOXYzine hydrochloride 25 milliGRAM(s) Oral every 4 hours PRN anxiety  LORazepam     Tablet 2 milliGRAM(s) Oral every 6 hours PRN agitation  LORazepam   Injectable 2 milliGRAM(s) IntraMuscular once PRN agitation  melatonin. 3 milliGRAM(s) Oral at bedtime PRN Insomnia  nicotine  Polacrilex Gum 4 milliGRAM(s) Oral every 2 hours PRN nicotine dependence  
MEDICATIONS  (STANDING):  atoMOXetine. 25 milliGRAM(s) Oral daily  cyanocobalamin 1000 MICROGram(s) Oral daily  multivitamin/minerals 1 Tablet(s) Oral daily  nicotine - 21 mG/24Hr(s) Patch 1 patch Transdermal daily  QUEtiapine 100 milliGRAM(s) Oral at bedtime    MEDICATIONS  (PRN):  diphenhydrAMINE 50 milliGRAM(s) Oral every 6 hours PRN agitation  diphenhydrAMINE Injectable 50 milliGRAM(s) IntraMuscular once PRN agitation  haloperidol     Tablet 5 milliGRAM(s) Oral every 6 hours PRN agiation  haloperidol    Injectable 5 milliGRAM(s) IntraMuscular once PRN agitation  hydrOXYzine hydrochloride 25 milliGRAM(s) Oral every 4 hours PRN anxiety  ibuprofen  Tablet. 400 milliGRAM(s) Oral every 6 hours PRN Mild Pain (1 - 3), Moderate Pain (4 - 6), Severe Pain (7 - 10)  LORazepam     Tablet 2 milliGRAM(s) Oral every 6 hours PRN agitation  LORazepam   Injectable 2 milliGRAM(s) IntraMuscular once PRN agitation  melatonin. 3 milliGRAM(s) Oral at bedtime PRN Insomnia  nicotine  Polacrilex Gum 4 milliGRAM(s) Oral every 2 hours PRN nicotine dependence

## 2022-04-06 NOTE — BH INPATIENT PSYCHIATRY PROGRESS NOTE - PRN MEDS
MEDICATIONS  (PRN):  diphenhydrAMINE 50 milliGRAM(s) Oral every 6 hours PRN agitation  diphenhydrAMINE Injectable 50 milliGRAM(s) IntraMuscular once PRN agitation  haloperidol     Tablet 5 milliGRAM(s) Oral every 6 hours PRN agiation  haloperidol    Injectable 5 milliGRAM(s) IntraMuscular once PRN agitation  hydrOXYzine hydrochloride 25 milliGRAM(s) Oral every 4 hours PRN anxiety  ibuprofen  Tablet. 400 milliGRAM(s) Oral every 6 hours PRN Mild Pain (1 - 3), Moderate Pain (4 - 6), Severe Pain (7 - 10)  LORazepam     Tablet 2 milliGRAM(s) Oral every 6 hours PRN agitation  LORazepam   Injectable 2 milliGRAM(s) IntraMuscular once PRN agitation  melatonin. 3 milliGRAM(s) Oral at bedtime PRN Insomnia  nicotine  Polacrilex Gum 4 milliGRAM(s) Oral every 2 hours PRN nicotine dependence  
MEDICATIONS  (PRN):  diphenhydrAMINE 50 milliGRAM(s) Oral every 6 hours PRN agitation  diphenhydrAMINE Injectable 50 milliGRAM(s) IntraMuscular once PRN agitation  haloperidol     Tablet 5 milliGRAM(s) Oral every 6 hours PRN agiation  haloperidol    Injectable 5 milliGRAM(s) IntraMuscular once PRN agitation  hydrOXYzine hydrochloride 25 milliGRAM(s) Oral every 4 hours PRN anxiety  ibuprofen  Tablet. 400 milliGRAM(s) Oral every 6 hours PRN Mild Pain (1 - 3), Moderate Pain (4 - 6), Severe Pain (7 - 10)  LORazepam     Tablet 2 milliGRAM(s) Oral every 6 hours PRN agitation  LORazepam   Injectable 2 milliGRAM(s) IntraMuscular once PRN agitation  melatonin. 3 milliGRAM(s) Oral at bedtime PRN Insomnia  nicotine  Polacrilex Gum 4 milliGRAM(s) Oral every 2 hours PRN nicotine dependence  
MEDICATIONS  (PRN):  diphenhydrAMINE 50 milliGRAM(s) Oral every 6 hours PRN agitation  diphenhydrAMINE Injectable 50 milliGRAM(s) IntraMuscular once PRN agitation  haloperidol     Tablet 5 milliGRAM(s) Oral every 6 hours PRN agiation  haloperidol    Injectable 5 milliGRAM(s) IntraMuscular once PRN agitation  hydrOXYzine hydrochloride 25 milliGRAM(s) Oral every 4 hours PRN anxiety  LORazepam     Tablet 2 milliGRAM(s) Oral every 6 hours PRN agitation  LORazepam   Injectable 2 milliGRAM(s) IntraMuscular once PRN agitation  melatonin. 3 milliGRAM(s) Oral at bedtime PRN Insomnia  nicotine  Polacrilex Gum 4 milliGRAM(s) Oral every 2 hours PRN nicotine dependence  
MEDICATIONS  (PRN):  diphenhydrAMINE 50 milliGRAM(s) Oral every 6 hours PRN agitation  diphenhydrAMINE Injectable 50 milliGRAM(s) IntraMuscular once PRN agitation  haloperidol     Tablet 5 milliGRAM(s) Oral every 6 hours PRN agiation  haloperidol    Injectable 5 milliGRAM(s) IntraMuscular once PRN agitation  hydrOXYzine hydrochloride 25 milliGRAM(s) Oral every 4 hours PRN anxiety  LORazepam     Tablet 2 milliGRAM(s) Oral every 6 hours PRN agitation  LORazepam   Injectable 2 milliGRAM(s) IntraMuscular once PRN agitation  melatonin. 3 milliGRAM(s) Oral at bedtime PRN Insomnia  nicotine  Polacrilex Gum 4 milliGRAM(s) Oral every 2 hours PRN nicotine dependence

## 2022-04-06 NOTE — BH INPATIENT PSYCHIATRY PROGRESS NOTE - NSTXSUBMISGOAL_PSY_ALL_CORE
Be able to verbalize an understanding of the association between substance abuse and mental health

## 2022-04-06 NOTE — BH INPATIENT PSYCHIATRY PROGRESS NOTE - NSBHFUPINTERVALHXFT_PSY_A_CORE
Patient seen and examined for follow up of mood symptoms   Chart reviewed and case discussed with nursing staff  No acute events over night  On exam today patient reports that she is feeling better and sleeping better  Had family meeting via phone with sister and sister-in-law  
Patient seen and examined for follow up of mood symptoms   Chart reviewed and case discussed with nursing staff  Reports that she is feeling better and sleeping better  Denies hopelessness  
Patient seen and examined for follow up of mood symptoms   Chart reviewed and case discussed with nursing staff  No acute events over night  On exam today patient reports that she slept better and asking for me to contact sister/sister but not mom  
Patient seen and examined for follow up of mood symptoms and discharge day management  Greater than 30 minutes spent with patient and charting as part of discharge day management   Chart reviewed and case discussed with nursing staff  Reports that she is feeling better and sleeping better and hopeful about her future  Denies hopelessness and all SI

## 2022-04-06 NOTE — BH INPATIENT PSYCHIATRY PROGRESS NOTE - NSCGIIMPROVESX_PSY_ALL_CORE
3 = Minimally improved - slightly better with little or no clinically meaningful reduction of symptoms.  Represents very little change in basic clinical status, level of care, or functional capacity.
2 = Much improved - notably better with signficant reduction of symptoms; increase in the level of functioning but some symptoms remain
1 - Very much improved - nearly all better; good level of functioning; minimal symptoms; represents a very substantial change

## 2022-04-06 NOTE — BH INPATIENT PSYCHIATRY PROGRESS NOTE - NSICDXBHSECONDARYDX_PSY_ALL_CORE
Post traumatic stress disorder (PTSD)   F43.10  ADHD   F90.9  GARO (generalized anxiety disorder)   F41.1  Cluster B personality disorder   F60.89  

## 2022-04-06 NOTE — BH INPATIENT PSYCHIATRY PROGRESS NOTE - NSBHINPTBILLING_PSY_ALL_CORE
39668 - Inpatient Low Complexity
71610 - Hospital Discharge Day Management; more than 30 min
00523 - Inpatient Moderate Complexity
86348 - Inpatient Moderate Complexity

## 2022-04-06 NOTE — BH INPATIENT PSYCHIATRY PROGRESS NOTE - NSBHASSESSSUMMFT_PSY_ALL_CORE
22yo F, domiciled with parents, withdrew from school when covid started, employed as a  in retail, no inpatient psychiatric hospitalizations, no suicide attempts, no self-harm, no legal, no access to firearms, h/o trauma (verbal abuse), h/o therapy on and off since age 11 (most recently 2019), h/o stuttering, bio father passed away at age 9, diagnosed with ADHD at age 15, current prescribed Adderall 10mg bid, alc  ohol use socially (5-6 beers 1-2x/week, h/o 2 blackouts, no withdrawal symptoms), +cannabis use (daily x past few years, vapes THC), h/o trying cocaine 1x last year, h/o acid 1x last year, chivo 1x last year, no PMH, NKDA/no food allergies, family history of undiagnosed alcohol use disorder (mother), substance use (brother smokes marijuana), uncle completed suicide, presents to [facility] reporting worsening anxiety and starting to feel depressed for the past 1 week    4/3 Patient less anxious and less depressed on exam  Allowing contact with sister & sister in law but not mother  Discussion with family with sxs that may be consistent with Bipolar II    PLAN:  Patient requires acute inpatient care for treatment of depression r/o Bipolar II.   Patient admitted on a voluntary status  Patient does not require constant observation at this time and will follow with routine checks.   Patient to be started on low dose quetiapine HS until symptoms clarified and atomoxetine instead of Adderall     Increase quetiapine to 100 mg  Atomoxetine 10 mg  Treatment will include individual therapy/supportive therapy/ rehab therapy/ psychopharmacological therapy and milieu therapy  
 22yo F, domiciled with parents, withdrew from school when covid started, employed as a  in retail, no inpatient psychiatric hospitalizations, no suicide attempts, no self-harm, no legal, no access to firearms, h/o trauma (verbal abuse), h/o therapy on and off since age 11 (most recently 2019), h/o stuttering, bio father passed away at age 9, diagnosed with ADHD at age 15, current prescribed Adderall 10mg bid, alc  ohol use socially (5-6 beers 1-2x/week, h/o 2 blackouts, no withdrawal symptoms), +cannabis use (daily x past few years, vapes THC), h/o trying cocaine 1x last year, h/o acid 1x last year, chivo 1x last year, no PMH, NKDA/no food allergies, family history of undiagnosed alcohol use disorder (mother), substance use (brother smokes marijuana), uncle completed suicide, presents to [facility] reporting worsening anxiety and starting to feel depressed for the past 1 week    4/4 Patient less anxious and less depressed on exam  Allowing contact with sister & sister-in law but not mother  Discussion with family with sxs that may be consistent with Bipolar II    PLAN:  Patient requires acute inpatient care for treatment of depression r/o Bipolar II.   Patient admitted on a voluntary status  Patient does not require constant observation at this time and will follow with routine checks.   Patient to be started on low dose quetiapine HS until symptoms clarified and atomoxetine instead of Adderall     Increase quetiapine to 100 mg  Atomoxetine 25 mg  Treatment will include individual therapy/supportive therapy/ rehab therapy/ psychopharmacological therapy and milieu therapy  
 22yo F, domiciled with parents, withdrew from school when covid started, employed as a  in retail, no inpatient psychiatric hospitalizations, no suicide attempts, no self-harm, no legal, no access to firearms, h/o trauma (verbal abuse), h/o therapy on and off since age 11 (most recently 2019), h/o stuttering, bio father passed away at age 9, diagnosed with ADHD at age 15, current prescribed Adderall 10mg bid, alc  ohol use socially (5-6 beers 1-2x/week, h/o 2 blackouts, no withdrawal symptoms), +cannabis use (daily x past few years, vapes THC), h/o trying cocaine 1x last year, h/o acid 1x last year, chivo 1x last year, no PMH, NKDA/no food allergies, family history of undiagnosed alcohol use disorder (mother), substance use (brother smokes marijuana), uncle completed suicide, presents to [facility] ED reporting worsening anxiety and starting to feel depressed for the past 1 week    4/6 Patient much improved and stable for discharge    Suicide and risk assessment performed prior to discharge.   The patient has a low acute risk and low chronic risk of self-harm and aggression towards others.     Protective factors include denying SI, no SIB, denying HI, good social supports in their family, no substance abuse, no current mood symptoms, no hopelessness, future-oriented in returning to home, no access to firearms.      Risk factors include presenting illness. Immediate risk was minimized by inpatient admission to a safe environment with appropriate supervision and limited access to lethal means.     Future risk was minimized before discharge by treatment of acute episode, maximizing outpatient support, providing relevant patient education, discussing emergency procedures, and ensuring close follow-up.     The patient remains at a low risk of self-harm, and such risk cannot be further ameliorated by continued inpatient treatment and the patient is therefore appropriate for discharge.       There were no behavioral problems on the unit.  Patient did not become agitated and did not require emergent intramuscular medications or seclusion / restraints.  Patient did not self-harm on the unit.      Patient remained actively engaged in treatment.  Patient participated in individual, group, and milieu therapy.  Patient got along appropriately with staff and peers.     Patient did not have any medical problems during this hospitalization.  There were no medical consultations.    A full discussion of the factors that predict treatment success and relapse was held including safety planning.  A discussion of the risks and benefits of patient’s medication was held including a discussion of the metabolic risks and risk of EPS and TD was done     On day of discharge, the patient no longer requires inpatient treatment and care. Patient denies all suicidal and aggressive ideation, intent and plan. Patient denies anxiety symptoms and panic attacks. Patient is not judged to be an acute danger to self or others at this time. Patient will be discharged to home and outpatient follow up at [facility]    
 20yo F, domiciled with parents, withdrew from school when covid started, employed as a  in retail, no inpatient psychiatric hospitalizations, no suicide attempts, no self-harm, no legal, no access to firearms, h/o trauma (verbal abuse), h/o therapy on and off since age 11 (most recently 2019), h/o stuttering, bio father passed away at age 9, diagnosed with ADHD at age 15, current prescribed Adderall 10mg bid, alc  ohol use socially (5-6 beers 1-2x/week, h/o 2 blackouts, no withdrawal symptoms), +cannabis use (daily x past few years, vapes THC), h/o trying cocaine 1x last year, h/o acid 1x last year, chivo 1x last year, no PMH, NKDA/no food allergies, family history of undiagnosed alcohol use disorder (mother), substance use (brother smokes marijuana), uncle completed suicide, presents to [facility] reporting worsening anxiety and starting to feel depressed for the past 1 week    4/5 Patient improved and less anxious and less depressed on exam  Allowing contact with sister & sister-in law but not mother  Discussion with family with sxs that may be consistent with Bipolar II    PLAN:  Patient requires acute inpatient care for treatment of depression r/o Bipolar II.   Patient admitted on a voluntary status  Patient does not require constant observation at this time and will follow with routine checks.   Patient to be started on low dose quetiapine HS until symptoms clarified and atomoxetine instead of Adderall     Quetiapine to 100 mg  Atomoxetine 25 mg  Treatment will include individual therapy/supportive therapy/ rehab therapy/ psychopharmacological therapy and milieu therapy

## 2022-04-06 NOTE — BH INPATIENT PSYCHIATRY PROGRESS NOTE - NSBHFUPINTERVALCCFT_PSY_A_CORE
"I am definitely feeling better!'
"I really slept better last night!!'
"I am sleeping better and feeling less anxious !'
"I am ready to go!'

## 2022-04-08 ENCOUNTER — OUTPATIENT (OUTPATIENT)
Dept: OUTPATIENT SERVICES | Facility: HOSPITAL | Age: 22
LOS: 1 days | Discharge: HOME | End: 2022-04-08
Payer: COMMERCIAL

## 2022-05-06 PROCEDURE — 99214 OFFICE O/P EST MOD 30 MIN: CPT | Mod: 95

## 2022-05-27 DIAGNOSIS — F33.2 MAJOR DEPRESSIVE DISORDER, RECURRENT SEVERE WITHOUT PSYCHOTIC FEATURES: ICD-10-CM

## 2022-07-29 PROCEDURE — 99214 OFFICE O/P EST MOD 30 MIN: CPT | Mod: 95

## 2022-11-18 PROCEDURE — 99214 OFFICE O/P EST MOD 30 MIN: CPT | Mod: 95

## 2023-02-21 NOTE — BH INPATIENT PSYCHIATRY PROGRESS NOTE - NSTXPROBANX_PSY_ALL_CORE
ANXIETY/PANIC/FEAR
Dapsone Pregnancy And Lactation Text: This medication is Pregnancy Category C and is not considered safe during pregnancy or breast feeding.